# Patient Record
(demographics unavailable — no encounter records)

---

## 2024-10-18 NOTE — HISTORY OF PRESENT ILLNESS
[Postpartum Follow Up] : postpartum follow up [Complications:___] : no complications [Last Pap Date: ___] : Last Pap Date: [unfilled] [Delivery Date: ___] : on [unfilled] [] : delivered by vaginal delivery [Male] : Delivery History: baby boy [Wt. ___] : weighing [unfilled] [Breastfeeding] : currently nursing [BF with Difficulty] : nursing without difficulty [Resumed Menses] : has not resumed her menses [Resumed [place]] : has not resumed intercourse [Intended Contraception] : Intended Contraception: [Condoms] : condoms [BreastFeeding Problems] : no breastfeeding problems [S/Sx PP Depression] : no signs/symptoms of postpartum depression [Suicidal Ideation] : no suicidal ideation [Back to Normal] : is back to normal in size [Normal] : the vagina was normal [Cervix Sample Taken] : cervical sample not taken for a Pap smear [Examination Of The Breasts] : breasts are normal [Doing Well] : is doing well [None] : None [FreeTextEntry8] : still having some bleeding after bowel movements from area above anus. Denies pain, itching, any concerns at this time. Planning to use condoms for birth control. No intercourse to date. Denies any urinary incontinence.  [de-identified] : Nipples intact poor abdominal tone noted  small pin point area directly adjacent to anus with redness and suture material. No bleeding, no pus, no odor. SSE done and moderate amt thick white discharge adherent to posterior vaginal wall present tight right pelvic muscle palpated on SVE- no pain with exam [de-identified] : 32y.o. P2  s/p  poor healing at apex of skin laceration [de-identified] : 1. Silver nitrate applied to area of poor healing. Pt tolerated this very well. 2. COntinue daily PNVs 3. DIscussed birth control options- will use condoms 4. Kegels, abdominal exercises rv'd 5. Pt aware to call if dyspareunia due to tight internal muscle- will give Referral for pelvic floor PT if this persists 6. Aware to call if s/s of PPD occur 7. RTO 1/2025 for annual